# Patient Record
Sex: MALE | Race: WHITE | NOT HISPANIC OR LATINO
[De-identification: names, ages, dates, MRNs, and addresses within clinical notes are randomized per-mention and may not be internally consistent; named-entity substitution may affect disease eponyms.]

---

## 2020-01-28 ENCOUNTER — APPOINTMENT (OUTPATIENT)
Dept: ORTHOPEDIC SURGERY | Facility: CLINIC | Age: 65
End: 2020-01-28
Payer: MEDICARE

## 2020-01-28 VITALS
WEIGHT: 200 LBS | BODY MASS INDEX: 28 KG/M2 | DIASTOLIC BLOOD PRESSURE: 87 MMHG | SYSTOLIC BLOOD PRESSURE: 130 MMHG | HEART RATE: 87 BPM | HEIGHT: 71 IN

## 2020-01-28 DIAGNOSIS — M19.071 PRIMARY OSTEOARTHRITIS, RIGHT ANKLE AND FOOT: ICD-10-CM

## 2020-01-28 PROCEDURE — 73610 X-RAY EXAM OF ANKLE: CPT | Mod: RT

## 2020-01-28 PROCEDURE — 99204 OFFICE O/P NEW MOD 45 MIN: CPT | Mod: 25

## 2020-01-28 PROCEDURE — 20606 DRAIN/INJ JOINT/BURSA W/US: CPT | Mod: RT

## 2020-01-28 RX ORDER — MELOXICAM 7.5 MG/1
7.5 TABLET ORAL
Qty: 30 | Refills: 0 | Status: ACTIVE | COMMUNITY
Start: 2020-01-28 | End: 1900-01-01

## 2020-01-28 NOTE — DISCUSSION/SUMMARY
[de-identified] : Discussed findings of today's exam and possible causes of patient's pain.  Educated patient on their most probable diagnosis of right ankle moderate osteoarthritis.  Reviewed possible courses of treatment, and we collaboratively decided best course of treatment at this time will include ultrasound guided cortisone injection today (see procedure note).  Informed the patient that the numbing medicine in today's injection will last for about 4-6 hours. The steroid that was injected will start to work in 1 to 2 days, peak at 1-2 weeks, and may last up to 1-2 months.  Patient started on a course of oral NSAIDs, prescription given for Mobic (We discussed all possible side effects of this medication). Patient does not wish to proceed with a course of physical therapy, states he has been to therapy many times in the past and he knows what exercises to do for his ankle. He will continue wearing ankle compression sleeve for support as needed.  Follow up as needed.  Patient appreciates and agrees with current plan.\par \par This note was generated using dragon medical dictation software.  A reasonable effort has been made for proofreading its contents, but typos may still remain.  If there are any questions or points of clarification needed please notify my office.

## 2020-01-28 NOTE — PROCEDURE
[de-identified] : Procedure:  Ultrasound Guided cortisone injection of the right ankle\par Indication for ultrasound guidance: Ensured placement within the joint, and avoidance of superficial vasculature and musculotendinous structures\par Indication for injection: Osteoarthritis\par \par Utilizing the Siemens [employer]on P500 portable ultrasound machine, the Linear L10-5v transducer, sterile probe cover and sterile ultrasound gel, ultrasound guidance with the probe in the longitudinal axis, utilizing an out of plane approach was used for the injection.  A discussion was had with the patient regarding this procedure and all questions were answered. All risks, benefits and alternatives were discussed. These included but were not limited to bleeding, infection, allergic reaction and reaccumulation of fluid. A timeout was done to ensure correct side and pt agreed to the procedure.  Alcohol was used to clean the skin, and betadine was used to sterilize and prep the area in the anterolateral ankle. Ethyl chloride spray was then used as a topical anesthetic. A 25-gauge needle was used to inject 2cc of 1% lidocaine without epinephrine and 1cc of 40mg/ml methylprednisolone into the ankle. A sterile bandage was then applied. The patient tolerated the procedure well.

## 2020-01-28 NOTE — PHYSICAL EXAM
[de-identified] : The following radiographs were ordered and read by me during this patient's visit. I reviewed each radiograph in detail with the patient and discussed the findings as highlighted below. \par \par 3 views of the right ankle were obtained today that show degenerative changes and evidence of moderate osteoarthritis in the tibiotalar joint.  No fracture, or dislocation seen at this time. There is no malalignment. No other obvious osseous abnormality. Otherwise unremarkable. [de-identified] : Constitutional: Well-nourished, well-developed, No acute distress\par Respiratory:  Good respiratory effort, no SOB\par Lymphatic: No regional lymphadenopathy, no lymphedema\par Psychiatric: Pleasant and normal affect, alert and oriented x3\par Skin: Clean dry and intact B/L UE/LE\par Musculoskeletal: normal except where as noted in regional exam\par \par \par B/L Hips: No asymmetry, malalignment, or swelling, Full ROM, 5/5 strength in flexion/ext, IR/ER, Abd/Add, Joints stable\par B/L Knees: No asymmetry, malalignment, or swelling, Full ROM, 5/5 strength in Flex/Ext, Joints stable\par BIOMECHANICAL EXAM: no marked leg length discrepancy, moderate hip abductor weakness b/l, + pes planus with marked foot pronation, tight hams and ITB b/l.  Normal gait and station\par \par Bilateral Ankles:\par APPEARANCE: no swelling, no marked deformities or malalignment\par POSITIVE TENDERNESS: Right ankle joint, medial > lateral\par NONTENDER: medial malleolus, lateral malleolus, tibialis posterior tendon, achilles tendon, no marked thickening of tendon, ATFL, CFL, PTFL, anterior tibiofibular ligament (high ankle), sinus tarsus, deltoid ligaments, 5th metatarsal. \par RANGE OF MOTION: Limited in all directions due to stiffness . \par RESISTIVE TESTING: painless resisted dorsiflexion, plantar flexion, inversion & eversion. \par SPECIAL TESTS: neg anterior drawer. neg talar tilt. neg Kleiger's\par NEURO: Normal sensation of LE, DTRs 2+/4 patella and achilles\par PULSES: 2+ DP/PT pulses\par \par

## 2020-01-28 NOTE — HISTORY OF PRESENT ILLNESS
[de-identified] : Patient is here for right ankle pain x4 weeks. He cannot remember any inciting injury, but says that he must have twisted it a little. He states that he has pain and swelling along the entire anterior ankle and behind the lateral malleolus. The pain comes and goes, does not radiate, and is worse with walking. He has tried Advil with minimal relief. He started using crutches for a few days when the pain first started, but stopped using the crutches after a few days. He also bought himself a lace up ankle brace and charmaine-socks, which he has been using on and off. He states that he had frequent sprains of his right ankle when he was in his 20's, and was told by a physician back then that his tendons were all probably torn. He also states that he had previous lumbar spine surgery about 10 years ago and the dorsum of his right foot has been numb ever since. \par \par The patient's past medical history, past surgical history, medications and allergies were reviewed by me today and documented accordingly. In addition, the patient's family and social history, which were noncontributory to this visit, were reviewed also. The patient has no family history of arthritis.

## 2020-11-30 ENCOUNTER — APPOINTMENT (OUTPATIENT)
Dept: ORTHOPEDIC SURGERY | Facility: CLINIC | Age: 65
End: 2020-11-30
Payer: MEDICARE

## 2020-11-30 VITALS
DIASTOLIC BLOOD PRESSURE: 86 MMHG | HEART RATE: 81 BPM | HEIGHT: 71 IN | BODY MASS INDEX: 28 KG/M2 | SYSTOLIC BLOOD PRESSURE: 123 MMHG | WEIGHT: 200 LBS

## 2020-11-30 DIAGNOSIS — S46.912A STRAIN OF UNSPECIFIED MUSCLE, FASCIA AND TENDON AT SHOULDER AND UPPER ARM LEVEL, LEFT ARM, INITIAL ENCOUNTER: ICD-10-CM

## 2020-11-30 PROCEDURE — 99213 OFFICE O/P EST LOW 20 MIN: CPT

## 2020-11-30 PROCEDURE — 73030 X-RAY EXAM OF SHOULDER: CPT | Mod: LT

## 2020-11-30 RX ORDER — PREDNISONE 10 MG/1
10 TABLET ORAL
Qty: 30 | Refills: 0 | Status: COMPLETED | COMMUNITY
Start: 2020-09-11

## 2020-11-30 RX ORDER — METHYLPREDNISOLONE 4 MG/1
4 TABLET ORAL
Qty: 21 | Refills: 0 | Status: COMPLETED | COMMUNITY
Start: 2020-09-11

## 2020-12-01 ENCOUNTER — APPOINTMENT (OUTPATIENT)
Dept: ORTHOPEDIC SURGERY | Facility: CLINIC | Age: 65
End: 2020-12-01
Payer: MEDICARE

## 2020-12-01 VITALS
DIASTOLIC BLOOD PRESSURE: 80 MMHG | BODY MASS INDEX: 28 KG/M2 | WEIGHT: 200 LBS | HEART RATE: 86 BPM | SYSTOLIC BLOOD PRESSURE: 133 MMHG | HEIGHT: 71 IN

## 2020-12-01 PROCEDURE — 20606 DRAIN/INJ JOINT/BURSA W/US: CPT | Mod: LT

## 2020-12-01 PROCEDURE — 99214 OFFICE O/P EST MOD 30 MIN: CPT | Mod: 25

## 2020-12-01 NOTE — PHYSICAL EXAM
[de-identified] : Constitutional: Well-nourished, well-developed, No acute distress\par Respiratory:  Good respiratory effort, no SOB\par Lymphatic: No regional lymphadenopathy, no lymphedema\par Psychiatric: Pleasant and normal affect, alert and oriented x3\par Skin: Clean dry and intact B/L UE/LE\par Musculoskeletal: normal except where as noted in regional exam\par \par Left ankle:\par APPEARANCE: mild swelling, no marked deformities or malalignment\par POSITIVE TENDERNESS: ATFL, CFL, PTFL, anterior tibiofibular ligament (high ankle), sinus tarsus\par NONTENDER: medial malleolus, lateral malleolus, tibialis posterior tendon, achilles tendon, no marked thickening of tendon, deltoid ligaments, 5th metatarsal. \par RANGE OF MOTION: Limited in all directions due to stiffness and pain\par RESISTIVE TESTING: Mild pain 4+/5 resisted dorsiflexion, plantar flexion, inversion & eversion. \par NEURO: Decreased sensation of entirety of left foot, normal sensation of the lower leg\par PULSES: 2+ DP/PT pulses\par  [de-identified] : The following radiographs were ordered and read by me during this patient's visit. I reviewed each radiograph in detail with the patient and discussed the findings as highlighted below. \par \par 3 views of the left ankle were obtained today that show degenerative changes and evidence of severe osteoarthritis in the tibiotalar joint.  No fracture, or dislocation seen at this time. There is no malalignment. No other obvious osseous abnormality. Otherwise unremarkable.

## 2020-12-01 NOTE — DISCUSSION/SUMMARY
[de-identified] : Discussed findings of today's exam and possible causes of patient's pain.  Educated patient on their most probable diagnosis of chronic left ankle pain due to severe end-stage osteoarthritis.  Reviewed possible courses of treatment, and we collaboratively decided best course of treatment at this time will include conservative management.  Patient is not interested in surgical consultation or surgical intervention at this time.  We discussed various treatment options as well as associated risk/benefits/alternatives and patient elected to proceed with ultrasound-guided cortisone injection today (see procedure note).  Informed the patient that the numbing medicine in today's injection will last for about 4-6 hours. The steroid that was injected will start to work in 1 to 2 days, peak at 1-2 weeks, and may last up to 1-2 months.  Patient advised that the cortisone injection is for pain relief of his chronic osteoarthritis pain.  He is advised this is not going to address the chronic numbness and lack of sensation he has in his foot, he is advised that we can evaluate this issue with EMG of the lower extremity and/or further evaluation of his lumbar spine, but patient has been living with this foot numbness for several years and does not wish to proceed with any evaluation at this time.  Follow up as needed.  Patient appreciates and agrees with current plan.\par \par This note was generated using dragon medical dictation software.  A reasonable effort has been made for proofreading its contents, but typos may still remain.  If there are any questions or points of clarification needed please notify my office.

## 2020-12-01 NOTE — PROCEDURE
[de-identified] : Procedure:  Ultrasound Guided cortisone injection of the left ankle\par Indication for ultrasound guidance: Ensured placement within the joint, and avoidance of superficial vasculature and musculotendinous structures\par Indication for injection: Osteoarthritis\par \par Utlizing the Siemens [employer]on P500 portable ultrasound machine, the Linear L10-5v transducer, sterile probe cover and sterile ultrasound gel, ultrasound guidance with the probe in the longitudinal axis, utilizing an out of plane approach was used for the injection.  A discussion was had with the patient regarding this procedure and all questions were answered. All risks, benefits and alternatives were discussed. These included but were not limited to bleeding, infection, allergic reaction and reaccumulation of fluid. A timeout was done to ensure correct side and pt agreed to the procedure.  Alcohol was used to clean the skin, and betadine was used to sterilize and prep the area in the anterolateral ankle. Ethyl chloride spray was then used as a topical anesthetic. A 25-gauge needle was used to inject 2cc of 1% lidocaine without epinephrine and 1cc of 40mg/ml methylprednisolone into the ankle. A sterile bandage was then applied. The patient tolerated the procedure well.

## 2020-12-01 NOTE — HISTORY OF PRESENT ILLNESS
[de-identified] : Patient is here for left ankle pain. He was treated with a cortisone injection into his right ankle in 01/2020 which provided great relief. He has chronic left ankle pain. He has not received an injection in this ankle but is interested in proceeding. He has not had an recent imaging done of this ankle. There has been no recent injury. He states that he used to have Vicodin or Oxycodone leftover from previous surgeries that he would take occasionally for pain but has since run out of that supply. He is requesting narcotic pain medication today.

## 2022-02-14 ENCOUNTER — APPOINTMENT (OUTPATIENT)
Dept: ORTHOPEDIC SURGERY | Facility: CLINIC | Age: 67
End: 2022-02-14
Payer: MEDICARE

## 2022-02-14 VITALS — WEIGHT: 200 LBS | HEIGHT: 71 IN | BODY MASS INDEX: 28 KG/M2

## 2022-02-14 DIAGNOSIS — M19.072 PRIMARY OSTEOARTHRITIS, LEFT ANKLE AND FOOT: ICD-10-CM

## 2022-02-14 PROCEDURE — 99214 OFFICE O/P EST MOD 30 MIN: CPT | Mod: 25

## 2022-02-14 PROCEDURE — 20606 DRAIN/INJ JOINT/BURSA W/US: CPT | Mod: LT

## 2022-02-14 NOTE — HISTORY OF PRESENT ILLNESS
[de-identified] : Patient is here for left ankle pain follow up. He had some relief from his injection. His biggest complaint is numbness in his ankle. He is planning on receiving an epidural injection into his low back. There has been no recent injury to his ankle. He has done nothing to treat this pain.

## 2022-02-14 NOTE — DISCUSSION/SUMMARY
[de-identified] : Discussed findings of today's exam and possible causes of patient's pain.  Educated patient on their most probable diagnosis of chronic left ankle pain with recent exacerbation due to severe end-stage osteoarthritis.  Reviewed possible courses of treatment, and we collaboratively decided best course of treatment at this time will include conservative management.  Patient is not interested in surgical consultation or surgical intervention at this time.  We discussed various treatment options as well as associated risk/benefits/alternatives and patient elected to proceed with repeat ultrasound-guided cortisone injection today (see procedure note).  Informed the patient that the numbing medicine in today's injection will last for about 4-6 hours. The steroid that was injected will start to work in 1 to 2 days, peak at 1-2 weeks, and may last up to 1-2 months.  Patient advised that the cortisone injection is for pain relief of his chronic osteoarthritis pain.  He is advised this is not going to address the chronic numbness and lack of sensation he has in his foot, patient will continue management of this issue with his spine injection Dr.  Follow up as needed.  Patient appreciates and agrees with current plan.\par \par I work as part of an academic orthopedic group and routinely have a physician in training (resident / fellow) working with me.  Any part of the history and physical exam performed by the physician in training was either directly reviewed and/or replicated by myself.  Any procedure performed by the physician in training was performed under my direct supervision and with the consent of the patient.\par \par This note was generated using dragon medical dictation software.  A reasonable effort has been made for proofreading its contents, but typos may still remain.  If there are any questions or points of clarification needed please notify my office.

## 2022-02-14 NOTE — PHYSICAL EXAM
[de-identified] : Constitutional: Well-nourished, well-developed, No acute distress\par Respiratory:  Good respiratory effort, no SOB\par Psychiatric: Pleasant and normal affect, alert and oriented x3\par Musculoskeletal: normal except where as noted in regional exam\par \par Left ankle:\par APPEARANCE: mild swelling, no marked deformities or malalignment\par POSITIVE TENDERNESS: ATFL, CFL, PTFL, anterior tibiofibular ligament (high ankle), sinus tarsus\par NONTENDER: medial malleolus, lateral malleolus, tibialis posterior tendon, achilles tendon, no marked thickening of tendon, deltoid ligaments, 5th metatarsal. \par RANGE OF MOTION: Limited in all directions due to stiffness and pain\par RESISTIVE TESTING: Mild pain 4+/5 resisted dorsiflexion, plantar flexion, inversion & eversion. \par NEURO: Decreased sensation of entirety of left foot, normal sensation of the lower leg\par PULSES: 2+ DP/PT pulses\par

## 2022-02-14 NOTE — PROCEDURE
[de-identified] : Procedure:  Ultrasound Guided cortisone injection of the left ankle\par Indication for ultrasound guidance: Ensured placement within the joint, and avoidance of superficial vasculature and musculotendinous structures\par Indication for injection: Osteoarthritis\par \par Utlizing the Siemens [employer]on P500 portable ultrasound machine, the Linear L10-5v transducer, sterile probe cover and sterile ultrasound gel, ultrasound guidance with the probe in the longitudinal axis, utilizing an out of plane approach was used for the injection.  A discussion was had with the patient regarding this procedure and all questions were answered. All risks, benefits and alternatives were discussed. These included but were not limited to bleeding, infection, allergic reaction and reaccumulation of fluid. A timeout was done to ensure correct side and pt agreed to the procedure.  Alcohol was used to clean the skin, and betadine was used to sterilize and prep the area in the anterolateral ankle. Ethyl chloride spray was then used as a topical anesthetic. A 25-gauge needle was used to inject 2cc of 1% lidocaine without epinephrine and 1cc of 40mg/ml methylprednisolone into the ankle. A sterile bandage was then applied. The patient tolerated the procedure well.

## 2022-06-14 ENCOUNTER — APPOINTMENT (OUTPATIENT)
Dept: ORTHOPEDIC SURGERY | Facility: CLINIC | Age: 67
End: 2022-06-14

## 2022-06-14 ENCOUNTER — NON-APPOINTMENT (OUTPATIENT)
Age: 67
End: 2022-06-14

## 2022-06-14 VITALS
HEIGHT: 71 IN | BODY MASS INDEX: 28 KG/M2 | WEIGHT: 200 LBS | SYSTOLIC BLOOD PRESSURE: 125 MMHG | DIASTOLIC BLOOD PRESSURE: 84 MMHG | HEART RATE: 77 BPM

## 2022-06-14 DIAGNOSIS — M25.673 STIFFNESS OF UNSPECIFIED ANKLE, NOT ELSEWHERE CLASSIFIED: ICD-10-CM

## 2022-06-14 DIAGNOSIS — M25.579 PAIN IN UNSPECIFIED ANKLE AND JOINTS OF UNSPECIFIED FOOT: ICD-10-CM

## 2022-06-14 PROCEDURE — 73630 X-RAY EXAM OF FOOT: CPT | Mod: LT

## 2022-06-14 PROCEDURE — 73610 X-RAY EXAM OF ANKLE: CPT | Mod: LT

## 2022-06-14 PROCEDURE — 99214 OFFICE O/P EST MOD 30 MIN: CPT
